# Patient Record
Sex: FEMALE | Race: WHITE | Employment: FULL TIME | URBAN - METROPOLITAN AREA
[De-identification: names, ages, dates, MRNs, and addresses within clinical notes are randomized per-mention and may not be internally consistent; named-entity substitution may affect disease eponyms.]

---

## 2018-01-14 ENCOUNTER — HOSPITAL ENCOUNTER (EMERGENCY)
Facility: CLINIC | Age: 28
Discharge: HOME | End: 2018-01-14
Attending: PHYSICIAN ASSISTANT | Admitting: PHYSICIAN ASSISTANT
Payer: MEDICAID

## 2018-01-14 VITALS
OXYGEN SATURATION: 97 % | SYSTOLIC BLOOD PRESSURE: 124 MMHG | DIASTOLIC BLOOD PRESSURE: 86 MMHG | WEIGHT: 174 LBS | RESPIRATION RATE: 16 BRPM | TEMPERATURE: 99.3 F | HEART RATE: 106 BPM

## 2018-01-14 DIAGNOSIS — J11.1 INFLUENZA-LIKE ILLNESS: ICD-10-CM

## 2018-01-14 LAB
INTERNAL QC OK POCT: YES
S PYO AG THROAT QL IA.RAPID: NEGATIVE

## 2018-01-14 PROCEDURE — 87880 STREP A ASSAY W/OPTIC: CPT | Performed by: PHYSICIAN ASSISTANT

## 2018-01-14 PROCEDURE — G0463 HOSPITAL OUTPT CLINIC VISIT: HCPCS

## 2018-01-14 PROCEDURE — 87081 CULTURE SCREEN ONLY: CPT | Performed by: PHYSICIAN ASSISTANT

## 2018-01-14 PROCEDURE — 99213 OFFICE O/P EST LOW 20 MIN: CPT | Performed by: PHYSICIAN ASSISTANT

## 2018-01-14 RX ORDER — BENZONATATE 100 MG/1
100 CAPSULE ORAL 3 TIMES DAILY PRN
Qty: 42 CAPSULE | Refills: 0 | Status: SHIPPED | OUTPATIENT
Start: 2018-01-14

## 2018-01-14 RX ORDER — ALBUTEROL SULFATE 90 UG/1
2 AEROSOL, METERED RESPIRATORY (INHALATION) EVERY 6 HOURS PRN
Qty: 1 INHALER | Refills: 0 | Status: SHIPPED | OUTPATIENT
Start: 2018-01-14

## 2018-01-14 NOTE — ED AVS SNAPSHOT
[facility] Emergency Department    [address] [address]    Phone:  [phone]    Fax:  [phone]                                       Cristina Patel   MRN: [id number]    Department:  [facility] Emergency Department   Date of Visit:  1/14/2018           After Visit Summary Signature Page     I have received my discharge instructions, and my questions have been answered. I have discussed any challenges I see with this plan with the nurse or doctor.    ..........................................................................................................................................  Patient/Patient Representative Signature      ..........................................................................................................................................  Patient Representative Print Name and Relationship to Patient    ..................................................               ................................................  Date                                            Time    ..........................................................................................................................................  Reviewed by Signature/Title    ...................................................              ..............................................  Date                                                            Time

## 2018-01-14 NOTE — ED AVS SNAPSHOT
[facility] Emergency Department    [address] [address]    Phone:  [phone]    Fax:  [phone]                                       Cristina Patel   MRN: [id number]    Department:  [facility] Emergency Department   Date of Visit:  1/14/2018           Patient Information     Date Of Birth          1990        Your diagnoses for this visit were:     Influenza-like illness        You were seen by Klarissa Richards PA-C.      Follow-up Information     Follow up In 3 days.    Why:  if no resolution of fever or sooner if new or worsening symptoms       Discharge References/Attachments     (ADULT), INFLUENZA (ENGLISH)      24 Hour Appointment Hotline       To make an appointment at any [facility] clinic, call [phone] ([phone]). If you don't have a family doctor or clinic, we will help you find one. [facility] clinics are conveniently located to serve the needs of you and your family.             Review of your medicines      START taking        Dose / Directions Last dose taken    albuterol 108 (90 BASE) MCG/ACT Inhaler   Commonly known as:  PROAIR HFA/PROVENTIL HFA/VENTOLIN HFA   Dose:  2 puff   Quantity:  1 Inhaler        Inhale 2 puffs into the lungs every 6 hours as needed for shortness of breath / dyspnea or wheezing   Refills:  0        benzonatate 100 MG capsule   Commonly known as:  TESSALON   Dose:  100 mg   Quantity:  42 capsule        Take 1 capsule (100 mg) by mouth 3 times daily as needed   Refills:  0                Prescriptions were sent or printed at these locations (2 Prescriptions)                   [facility] [address]   [address] [address]    Telephone:  [phone]   Fax:  [phone]   Hours:                  E-Prescribed (2 of 2)         albuterol (PROAIR HFA/PROVENTIL HFA/VENTOLIN HFA) 108 (90 BASE) MCG/ACT Inhaler               benzonatate (TESSALON) 100 MG capsule               "  Procedures and tests performed during your visit     Beta strep group A culture    Rapid strep group A screen POCT      Orders Needing Specimen Collection     None      Pending Results     Date and Time Order Name Status Description    1/14/2018 1811 Beta strep group A culture In process             Pending Culture Results     Date and Time Order Name Status Description    1/14/2018 1811 Beta strep group A culture In process             Pending Results Instructions     If you had any lab results that were not finalized at the time of your Discharge, you can call the ED Lab Result RN at [phone]. You will be contacted by this team for any positive Lab results or changes in treatment. The nurses are available 7 days a week from 10A to 6:30P.  You can leave a message 24 hours per day and they will return your call.        Test Results From Your Hospital Stay        1/14/2018  6:11 PM      Component Results     Component Value Ref Range & Units Status    Rapid Strep A Screen Negative neg Final    Internal QC OK Yes  Final         1/14/2018  6:23 PM                Thank you for choosing [facility]       Thank you for choosing [facility] for your care. Our goal is always to provide you with excellent care. Hearing back from our patients is one way we can continue to improve our services. Please take a few minutes to complete the written survey that you may receive in the mail after you visit with us. Thank you!        [employer] Information     [employer] lets you send messages to your doctor, view your test results, renew your prescriptions, schedule appointments and more. To sign up, go to [email or web address] . Click on \"Log in\" on the left side of the screen, which will take you to the Welcome page. Then click on \"Sign up Now\" on the right side of the page.     You will be asked to enter the access code listed below, as well as some personal information. Please follow the directions to create your username and " password.     Your access code is: [email or web address]  Expires: 2018  6:26 PM     Your access code will  in 90 days. If you need help or a new code, please call your [place] clinic or [phone].        Care EveryWhere ID     This is your Care EveryWhere ID. This could be used by other organizations to access your [place] medical records  [id number]        Equal Access to Services     TSEPHANIE PICKARD : Winston bairdo Sogiuseppe, waaxda luqadaha, qaybta kaalmada adeegyada, octaviano figueroa . So [facility] [phone].    ATENCIÓN: Si habla español, tiene a ceron disposición servicios gratuitos de asistencia lingüística. Llame al [phone].    We comply with applicable federal civil rights laws and [place] laws. We do not discriminate on the basis of race, color, national origin, age, disability, sex, sexual orientation, or gender identity.            After Visit Summary       This is your record. Keep this with you and show to your community pharmacist(s) and doctor(s) at your next visit.

## 2018-01-14 NOTE — ED NOTES
Patient here for SCOTT, symptoms started 3 days ago.  Patient presents amulatory to the urgent care.

## 2018-01-15 NOTE — ED PROVIDER NOTES
History     Chief Complaint   Patient presents with     Influenza     HPI  Cristina Patel is a 27 year old female who presents to the urgent care with concern over illness which began 36 hours ago.  Patient complains of fever up to 101 orally, chills, myalgias, nasal congestion, cough.  She does complain of some shortness of breath directable coughing however denies any with activity or at rest.  She has not had any wheezing, vomiting, diarrhea or abdominal pain.  She has attempted to treat with ibuprofen, last dose was approximately 6 hours prior to arrival. She states she did have a history of asthma as a child however has not required any treatment as an adult.  She is a half pack a day smoker.    Problem List:    There are no active problems to display for this patient.       Past Medical History:    History reviewed. No pertinent past medical history.    Past Surgical History:    History reviewed. No pertinent surgical history.    Family History:    No family history on file.    Social History:  Marital Status:  Single [1]  Social History   Substance Use Topics     Smoking status: Never Smoker     Smokeless tobacco: Never Used     Alcohol use Not on file        Medications:      albuterol (PROAIR HFA/PROVENTIL HFA/VENTOLIN HFA) 108 (90 BASE) MCG/ACT Inhaler   benzonatate (TESSALON) 100 MG capsule     Review of Systems  CONSTITUTIONAL:POSITIVE  for fever up to 101, chills, myalgias   INTEGUMENTARY/SKIN: NEGATIVE for worrisome rashes, moles or lesions  EYES: NEGATIVE for vision changes or irritation  ENT/MOUTH: POSITIVE for sore throat, nasal congestion, NEGATIVE for ear pain   RESP:POSITIVE for cough and shortness of breath NEGATIVE for wheezing  GI: POSITIVE for nausea and NEGATIVE for abdominal pain, diarrhea and vomiting  Physical Exam   BP: 124/86  Pulse: 106  Temp: 99.3  F (37.4  C)  Resp: 16  Weight: 78.9 kg (174 lb)  SpO2: 97 %    Physical Exam  GENERAL APPEARANCE: healthy, alert and no  distress  EYES: EOMI,  PERRL, conjunctiva clear  HENT: ear canals and TM's normal.  His discharge present.  Posterior pharynx is nonerythematous without exudate  NECK: supple, nontender, no lymphadenopathy  RESP: lungs clear to auscultation - no rales, rhonchi or wheezes  CV: tachycardia, regular rhythm, normal S1 S2, no murmur noted  ABDOMEN:  soft, nontender, no HSM or masses and bowel sounds normal  SKIN: no suspicious lesions or rashes  ED Course     ED Course     Procedures          Critical Care time:  none            Labs Ordered and Resulted from Time of ED Arrival Up to the Time of Departure from the ED   RAPID STREP GROUP A SCREEN POCT - Normal   BETA STREP GROUP A CULTURE     Assessments & Plan (with Medical Decision Making)     I have reviewed the nursing notes.    I have reviewed the findings, diagnosis, plan and need for follow up with the patient.       Discharge Medication List as of 1/14/2018  6:26 PM      START taking these medications    Details   albuterol (PROAIR HFA/PROVENTIL HFA/VENTOLIN HFA) 108 (90 BASE) MCG/ACT Inhaler Inhale 2 puffs into the lungs every 6 hours as needed for shortness of breath / dyspnea or wheezing, Disp-1 Inhaler, R-0, E-Prescribe      benzonatate (TESSALON) 100 MG capsule Take 1 capsule (100 mg) by mouth 3 times daily as needed, Disp-42 capsule, R-0, E-Prescribe           Final diagnoses:   Influenza-like illness     19-year-old female presents to urgent care with concern over 36 hour history of nasal congestion, cough, fever, chills, myalgias.  She was noted to be minimally tachycardic upon arrival.  Remainder vital signs within normal limits.  Physical exam findings as described above.  As part of evaluation she did have rapid strep test which was negative with culture pending prior to my examination.  Symptoms are classic for influenza.  I have low suspicion for pneumonia, pertussis, PE, bronchitis, bacterial sinusitis at this time.  I did discuss her/benefits of  Tamiflu with patient however she agreed to defer at this time.  She was given prescriptions for albuterol inhaler, Tessalon for symptomatic relief.  She was instructed to follow-up with primary care provider if no resolution of fever within the next 48-72 hours.  Worrisome reasons to return to the ER/UC sooner discussed    1/14/2018   [facility] EMERGENCY DEPARTMENT     Klarissa Richards PA-C  01/14/18 [id number]

## 2018-01-16 LAB
BACTERIA SPEC CULT: NORMAL
Lab: NORMAL
SPECIMEN SOURCE: NORMAL

## 2018-01-24 ENCOUNTER — ALLIED HEALTH/NURSE VISIT (OUTPATIENT)
Dept: NURSING | Facility: CLINIC | Age: 28
End: 2018-01-24
Payer: MEDICAID

## 2018-01-24 DIAGNOSIS — Z11.1 SCREENING EXAMINATION FOR PULMONARY TUBERCULOSIS: Primary | ICD-10-CM

## 2018-01-24 PROCEDURE — 99207 ZZC NO CHARGE NURSE ONLY: CPT

## 2018-01-24 PROCEDURE — 86580 TB INTRADERMAL TEST: CPT

## 2018-01-24 NOTE — MR AVS SNAPSHOT
"              After Visit Summary   1/24/2018    Cristina Patel    MRN: [id number]           Patient Information     Date Of Birth          1990        Visit Information        Provider Department      1/24/2018 11:00 AM AN ANCILLARY [facility]        Today's Diagnoses     Screening examination for pulmonary tuberculosis    -  1       Follow-ups after your visit        Your next 10 appointments already scheduled     Jan 26, 2018 12:00 PM CST   Nurse Only with An Rn   [facility]    [address] [facility] [address] [phone]              Who to contact     If you have questions or need follow up information about today's clinic visit or your schedule please contact [facility] directly at [phone].  Normal or non-critical lab and imaging results will be communicated to you by MyChart, letter or phone within 4 business days after the clinic has received the results. If you do not hear from us within 7 days, please contact the clinic through [employer] or phone. If you have a critical or abnormal lab result, we will notify you by phone as soon as possible.  Submit refill requests through [employer] or call your pharmacy and they will forward the refill request to us. Please allow 3 business days for your refill to be completed.          Additional Information About Your Visit        [employer] Information     [employer] lets you send messages to your doctor, view your test results, renew your prescriptions, schedule appointments and more. To sign up, go to [email or web address] . Click on \"Log in\" on the left side of the screen, which will take you to the Welcome page. Then click on \"Sign up Now\" on the right side of the page.     You will be asked to enter the access code listed below, as well as some personal information. Please follow the directions to create your username and password.     Your access code is: " [email or web address]  Expires: 2018  6:26 PM     Your access code will  in 90 days. If you need help or a new code, please call your [place] clinic or [phone].        Care EveryWhere ID     This is your Care EveryWhere ID. This could be used by other organizations to access your [place] medical records  [id number]         Blood Pressure from Last 3 Encounters:   18 124/86    Weight from Last 3 Encounters:   18 174 lb (78.9 kg)              We Performed the Following     TB INTRADERMAL TEST        Primary Care Provider Office Phone # Fax #    Noe [facility] [phone]       [address] [address]        Equal Access to Services     STEPHANIE PICKARD : Hadii aad ku hadasho Soomaali, waaxda luqadaha, qaybta kaalmada adeegyada, waxay idiin haymissyn jona figueroa . So [facility] [phone].    ATENCIÓN: Si habla español, tiene a ceron disposición servicios gratuitos de asistencia lingüística. Ll[facility] [phone].    We comply with applicable federal civil rights laws and [place] laws. We do not discriminate on the basis of race, color, national origin, age, disability, sex, sexual orientation, or gender identity.            Thank you!     [facility] you [facility]  [facility]. Our goal is always to provide you with excellent care. Hearing back from our patients is one way we can continue to improve our services. Please take a few minutes to complete the written survey that you may receive in the mail after your visit with us. Thank you!             Your Updated Medication List - Protect others around you: Learn how to safely use, store and throw away your medicines at www.disposemymeds.org.          This list is accurate as of 18 11:10 AM.  Always use your most recent med list.                   Brand Name Dispense Instructions for use Diagnosis    albuterol 108 (90 BASE) MCG/ACT Inhaler    PROAIR HFA/PROVENTIL HFA/VENTOLIN HFA     1 Inhaler    Inhale 2 puffs into the lungs every 6 hours as needed for shortness of breath / dyspnea or wheezing        benzonatate 100 MG capsule    TESSALON    42 capsule    Take 1 capsule (100 mg) by mouth 3 times daily as needed

## 2018-01-24 NOTE — PROGRESS NOTES
The patient is asked the following questions today and these are her answers:    -Have you had a mantoux administered in the past 30 days?    No  -Have you had a previous positive Mantoux.  No  -Have you received BCG in the past.  No  -Have you had a live vaccine  (MMR, Varicella, OPV, Yellow Fever) in the last 6 weeks.  No  -Have you had and active  viral or bacterial infection in the past 6 weeks.  No  -Have you received corticosteroids or immunosuppressive agents in the past 6 weeks.  No  -Have you been diagnosed with HIV?  No  -Do you have a maglinancy?  No     Antonia Gan MA

## 2018-05-29 ENCOUNTER — OFFICE VISIT (OUTPATIENT)
Dept: URGENT CARE | Facility: URGENT CARE | Age: 28
End: 2018-05-29
Payer: COMMERCIAL

## 2018-05-29 VITALS
SYSTOLIC BLOOD PRESSURE: 120 MMHG | DIASTOLIC BLOOD PRESSURE: 71 MMHG | HEART RATE: 115 BPM | WEIGHT: 184 LBS | OXYGEN SATURATION: 99 % | TEMPERATURE: 98.4 F

## 2018-05-29 DIAGNOSIS — R07.0 THROAT PAIN: Primary | ICD-10-CM

## 2018-05-29 LAB
DEPRECATED S PYO AG THROAT QL EIA: NORMAL
SPECIMEN SOURCE: NORMAL

## 2018-05-29 PROCEDURE — 99203 OFFICE O/P NEW LOW 30 MIN: CPT | Performed by: PHYSICIAN ASSISTANT

## 2018-05-29 PROCEDURE — 87081 CULTURE SCREEN ONLY: CPT | Performed by: PHYSICIAN ASSISTANT

## 2018-05-29 PROCEDURE — 87880 STREP A ASSAY W/OPTIC: CPT | Performed by: PHYSICIAN ASSISTANT

## 2018-05-29 NOTE — MR AVS SNAPSHOT
After Visit Summary   5/29/2018    Cristina Patel    MRN: [id number]           Patient Information     Date Of Birth          1990        Visit Information        Provider Department      5/29/2018 7:50 PM Ritu Pelayo PA-C [facility]        Today's Diagnoses     Throat pain    -  1       Follow-ups after your visit        Who to contact     If you have questions or need follow up information about today's clinic visit or your schedule please contact [facility] directly at [phone].  Normal or non-critical lab and imaging results will be communicated to you by MyChart, letter or phone within 4 business days after the clinic has received the results. If you do not hear from us within 7 days, please contact the clinic through MyChart or phone. If you have a critical or abnormal lab result, we will notify you by phone as soon as possible.  Submit refill requests through [employer]Z or call your pharmacy and they will forward the refill request to us. Please allow 3 business days for your refill to be completed.          Additional Information About Your Visit        Care EveryWhere ID     This is your Care EveryWhere ID. This could be used by other organizations to access your [place] medical records  [id number]        Your Vitals Were     Pulse Temperature Pulse Oximetry             115 98.4  F (36.9  C) (Oral) 99%          Blood Pressure from Last 3 Encounters:   05/29/18 120/71   01/14/18 124/86    Weight from Last 3 Encounters:   05/29/18 184 lb (83.5 kg)   01/14/18 174 lb (78.9 kg)              We Performed the Following     Beta strep group A culture     Strep, Rapid Screen        Primary Care Provider Office Phone # Fax #    [facility] [phone] [address] [address]        Equal Access to Services     STEPHANIE PICKARD AH: janene Rgoers qaybta kaalmada adeegyada, waxay  avis armstrongheather garcia'aan ah. So [facility] [phone].    ATENCIÓN: Si habla madi, tiene a ceron disposición servicios gratuitos de asistencia lingüística. Myranda al [phone].    We comply with applicable federal civil rights laws and [place] laws. We do not discriminate on the basis of race, color, national origin, age, disability, sex, sexual orientation, or gender identity.            Thank you!     Thank you for choosing [facility]  for your care. Our goal is always to provide you with excellent care. Hearing back from our patients is one way we can continue to improve our services. Please take a few minutes to complete the written survey that you may receive in the mail after your visit with us. Thank you!             Your Updated Medication List - Protect others around you: Learn how to safely use, store and throw away your medicines at www.disposemymeds.org.          This list is accurate as of 5/29/18  8:29 PM.  Always use your most recent med list.                   Brand Name Dispense Instructions for use Diagnosis    albuterol 108 (90 Base) MCG/ACT Inhaler    PROAIR HFA/PROVENTIL HFA/VENTOLIN HFA    1 Inhaler    Inhale 2 puffs into the lungs every 6 hours as needed for shortness of breath / dyspnea or wheezing        benzonatate 100 MG capsule    TESSALON    42 capsule    Take 1 capsule (100 mg) by mouth 3 times daily as needed

## 2018-05-30 LAB
BACTERIA SPEC CULT: NORMAL
SPECIMEN SOURCE: NORMAL

## 2018-05-30 NOTE — PROGRESS NOTES
SUBJECTIVE:                                                    Cristina Patel is a 28 year old female who presents to clinic today for the following health issues:    Sore throat ×1 day.  No cough.  No nasal congestion.  No fever.  No vomiting or nausea.  No constipation or diarrhea.  Was exposed to strep by a little boy she was with the entire day. Positive for HA.    PMX- no allergic rhinitis    Allergies   Allergen Reactions     Nsaids      Vancomycin Itching       No past medical history on file.      Current Outpatient Prescriptions on File Prior to Visit:  albuterol (PROAIR HFA/PROVENTIL HFA/VENTOLIN HFA) 108 (90 BASE) MCG/ACT Inhaler Inhale 2 puffs into the lungs every 6 hours as needed for shortness of breath / dyspnea or wheezing (Patient not taking: Reported on 5/29/2018)   benzonatate (TESSALON) 100 MG capsule Take 1 capsule (100 mg) by mouth 3 times daily as needed     No current facility-administered medications on file prior to visit.     Social History   Substance Use Topics     Smoking status: Never Smoker     Smokeless tobacco: Never Used     Alcohol use Not on file       ROS:  CONSTITUTIONAL: Negative for fatigue or fever.  EYES: Negative for eye problems.  ENT: As above.  RESP: As above.  CV: Negative for chest pains.  GI: Negative for vomiting.  MUSCULOSKELETAL:  Negative for significant muscle or joint pains.  NEUROLOGIC: Positive for headaches.  SKIN: Negative for rash.    OBJECTIVE:  /71  Pulse 115  Temp 98.4  F (36.9  C) (Oral)  Wt 184 lb (83.5 kg)  SpO2 99%  GENERAL APPEARANCE: Healthy, alert and no distress.  EYES:Conjunctiva/sclera clear.  EARS: No cerumen.   Ear canals w/o erythema.  TM's intact w/o erythema.    NOSE/MOUTH: Nose without ulcers, erythema or lesions.  SINUSES: No maxillary sinus tenderness.  THROAT: Mild  No erythema w/o tonsillar enlargement . No exudates.  NECK: Supple, nontender, no lymphadenopathy.  RESP: Lungs clear to auscultation - no rales, rhonchi  or wheezes  CV: Regular rate and rhythm, normal S1 S2, no murmur noted.  NEURO: Awake, alert    SKIN: No rashes    Results for orders placed or performed in visit on 05/29/18   Strep, Rapid Screen   Result Value Ref Range    Specimen Description Throat     Rapid Strep A Screen       NEGATIVE: No Group A streptococcal antigen detected by immunoassay, await culture report.      ASSESSMENT:     ICD-10-CM    1. Throat pain R07.0 Strep, Rapid Screen         PLAN:Likely viral. Salt water gargles. Ibu.Lots of rest and fluids.  RTC if any worsening symptoms or if not improving.    Ritu Pelayo PA-C

## 2020-07-16 ENCOUNTER — HOSPITAL ENCOUNTER (EMERGENCY)
Facility: CLINIC | Age: 30
Discharge: HOME | End: 2020-07-16
Attending: PHYSICIAN ASSISTANT | Admitting: PHYSICIAN ASSISTANT

## 2020-07-16 ENCOUNTER — APPOINTMENT (OUTPATIENT)
Dept: GENERAL RADIOLOGY | Facility: CLINIC | Age: 30
End: 2020-07-16
Attending: PHYSICIAN ASSISTANT

## 2020-07-16 VITALS
RESPIRATION RATE: 20 BRPM | SYSTOLIC BLOOD PRESSURE: 117 MMHG | DIASTOLIC BLOOD PRESSURE: 93 MMHG | OXYGEN SATURATION: 98 % | TEMPERATURE: 98.5 F | WEIGHT: 199.8 LBS | HEART RATE: 81 BPM

## 2020-07-16 DIAGNOSIS — S93.601A RIGHT FOOT SPRAIN: ICD-10-CM

## 2020-07-16 PROCEDURE — 73630 X-RAY EXAM OF FOOT: CPT | Mod: TC,RT

## 2020-07-16 PROCEDURE — 25000128 H RX IP 250 OP 636: Performed by: PHYSICIAN ASSISTANT

## 2020-07-16 PROCEDURE — 99284 EMERGENCY DEPT VISIT MOD MDM: CPT | Performed by: PHYSICIAN ASSISTANT

## 2020-07-16 PROCEDURE — 25000132 ZZH RX MED GY IP 250 OP 250 PS 637: Performed by: PHYSICIAN ASSISTANT

## 2020-07-16 PROCEDURE — 99284 EMERGENCY DEPT VISIT MOD MDM: CPT | Mod: Z6 | Performed by: PHYSICIAN ASSISTANT

## 2020-07-16 RX ORDER — OXYCODONE HYDROCHLORIDE 5 MG/1
2.5-5 TABLET ORAL EVERY 6 HOURS PRN
Qty: 6 TABLET | Refills: 0 | Status: SHIPPED | OUTPATIENT
Start: 2020-07-16

## 2020-07-16 RX ORDER — OXYCODONE HYDROCHLORIDE 5 MG/1
5 TABLET ORAL ONCE
Status: COMPLETED | OUTPATIENT
Start: 2020-07-16 | End: 2020-07-16

## 2020-07-16 RX ORDER — ONDANSETRON 4 MG/1
4 TABLET, ORALLY DISINTEGRATING ORAL ONCE
Status: COMPLETED | OUTPATIENT
Start: 2020-07-16 | End: 2020-07-16

## 2020-07-16 RX ADMIN — OXYCODONE HYDROCHLORIDE 5 MG: 5 TABLET ORAL at 13:39

## 2020-07-16 RX ADMIN — ONDANSETRON 4 MG: 4 TABLET, ORALLY DISINTEGRATING ORAL at 13:39

## 2020-07-16 NOTE — ED TRIAGE NOTES
"Patient c/o right foot pain and shin pain. She heard a \"pop\" yesterday while lifting something heavy.  "

## 2020-07-16 NOTE — ED AVS SNAPSHOT
[facility] Emergency Department  [address] [place] [address]  Phone:  [phone]  Fax:  [phone]                                    Cristina Patel   MRN: [id number]    Department:  [facility] Emergency Department   Date of Visit:  7/16/2020           After Visit Summary Signature Page    I have received my discharge instructions, and my questions have been answered. I have discussed any challenges I see with this plan with the nurse or doctor.    ..........................................................................................................................................  Patient/Patient Representative Signature      ..........................................................................................................................................  Patient Representative Print Name and Relationship to Patient    ..................................................               ................................................  Date                                   Time    ..........................................................................................................................................  Reviewed by Signature/Title    ...................................................              ..............................................  Date                                               Time          22EPIC Rev 08/18

## 2020-07-16 NOTE — ED PROVIDER NOTES
History     Chief Complaint   Patient presents with     Foot Pain     HPI  Cristina Patel is a 30 year old female who presents for evaluation of an injury to her right foot that occurred 2 hours prior to arrival.  She was lifting a heavy bar when she states the ground let loose below her.  She felt a popping sensation with acute pain in the second through fourth metatarsal area.  She has not had any swelling or bruising.  Has been unable to bear weight due to the pain.  Describes it as a sharp stabbing pain.  She took Tylenol 2 hours prior to arrival 1000 mg, but her pain is still 7 on a scale of 10.  She denies any prior foot problems.  Denies any numbness or tingling of the toes or foot.        Allergies:  Allergies   Allergen Reactions     Codeine Nausea     Gabapentin Nausea and Other (See Comments)     nightmares       Nsaids      Pregabalin Other (See Comments)     Other reaction(s): Sedation  Can't stay awake  Can't stay awake       Sulfacetamide Itching     Eye drops, red/itchy/swelling  Eye drops, red/itchy/swelling       Tolmetin Other (See Comments)     bleeding  She has platelet sensitivity syndrome.      Vancomycin Itching     Adhesive Tape Rash     Micropore tape       Problem List:    There are no active problems to display for this patient.       Past Medical History:    History reviewed. No pertinent past medical history.    Past Surgical History:    History reviewed. No pertinent surgical history.    Family History:    No family history on file.    Social History:  Marital Status:  Single [1]  Social History     Tobacco Use     Smoking status: Current Every Day Smoker     Packs/day: 0.50     Smokeless tobacco: Never Used   Substance Use Topics     Alcohol use: Yes     Comment: rarely     Drug use: Never        Medications:    oxyCODONE (ROXICODONE) 5 MG tablet  albuterol (PROAIR HFA/PROVENTIL HFA/VENTOLIN HFA) 108 (90 BASE) MCG/ACT Inhaler  benzonatate (TESSALON) 100 MG  capsule          Review of Systems   All other systems reviewed and are negative.      Physical Exam   BP: (!) 117/93  Pulse: 81  Temp: 98.5  F (36.9  C)  Resp: 20  Weight: 90.6 kg (199 lb 12.8 oz)  SpO2: 98 %      Physical Exam  Vitals signs and nursing note reviewed.   Constitutional:       General: She is not in acute distress.     Appearance: She is not diaphoretic.   HENT:      Head: Normocephalic and atraumatic.      Right Ear: External ear normal.      Left Ear: External ear normal.      Nose: Nose normal.      Mouth/Throat:      Pharynx: No oropharyngeal exudate.   Eyes:      General: No scleral icterus.        Right eye: No discharge.         Left eye: No discharge.      Conjunctiva/sclera: Conjunctivae normal.      Pupils: Pupils are equal, round, and reactive to light.   Neck:      Musculoskeletal: Normal range of motion and neck supple.      Thyroid: No thyromegaly.   Cardiovascular:      Rate and Rhythm: Normal rate and regular rhythm.      Heart sounds: Normal heart sounds. No murmur.   Pulmonary:      Effort: Pulmonary effort is normal. No respiratory distress.      Breath sounds: Normal breath sounds. No wheezing or rales.   Chest:      Chest wall: No tenderness.   Abdominal:      General: Bowel sounds are normal. There is no distension.      Palpations: Abdomen is soft. There is no mass.      Tenderness: There is no abdominal tenderness. There is no guarding or rebound.   Musculoskeletal:         General: No deformity.      Right ankle: Normal. She exhibits normal range of motion, no swelling, no ecchymosis, no deformity, no laceration and normal pulse. No tenderness. Achilles tendon exhibits no pain, no defect and normal Kirkland's test results.      Right lower leg: Normal. She exhibits no tenderness, no bony tenderness, no swelling, no deformity and no laceration. No edema.      Right foot: Decreased range of motion. Tenderness present. No swelling, deformity or laceration.         Feet:    Lymphadenopathy:      Cervical: No cervical adenopathy.   Skin:     General: Skin is warm and dry.      Capillary Refill: Capillary refill takes less than 2 seconds.      Findings: No erythema or rash.   Neurological:      Mental Status: She is alert and oriented to person, place, and time.      Cranial Nerves: No cranial nerve deficit.   Psychiatric:         Behavior: Behavior normal.         Thought Content: Thought content normal.         ED Course        Procedures               Critical Care time:  none               Results for orders placed or performed during the hospital encounter of 07/16/20 (from the past 24 hour(s))   XR Foot Right 3 Views    Narrative    RIGHT FOOT THREE OR MORE VIEWS   7/16/2020 1:53 PM     HISTORY: Right 2nd-4th metatarsal discomfort, injury.    COMPARISON: None.      Impression    IMPRESSION: No evidence of fracture. Joint spaces preserved.    KRISHAN SPRAGUE MD       Medications   oxyCODONE (ROXICODONE) tablet 5 mg (5 mg Oral Given 7/16/20 1130)   ondansetron (ZOFRAN-ODT) ODT tab 4 mg (4 mg Oral Given 7/16/20 7289)       Assessments & Plan (with Medical Decision Making)  Right foot sprain     30 year old female presents for evaluation of acute onset right foot pain 2 hours ago when she was lifting a bar and the ground gave way beneath her.  She felt a sharp pain with an audible pop in her dorsal foot.  Has had a hard time bearing weight ever since then.  Pain rated 7 on a scale of 10 and not responding to Tylenol 1000 mg.  On exam she is afebrile.  No deformity, bruising, or swelling of the foot noted.  Tender to palpation of the second-fourth metatarsals without crepitus or other abnormality.  She is requesting pain medication, therefore I did give her 5 mg of oxycodone along with Zofran as she states that she gets nauseous with any pain medication.  X-rays displayed no evidence for fracture.  She was wrapped in an Ace bandage and given a cam walker.  She is able to bear  weight, therefore she was not given crutches.  Patient requested pain medication for at home.  [place]  was referenced and no concerning refill patterns identified.  She was given 6 tabs of oxycodone to use for breakthrough pain if ibuprofen and Tylenol were not helping her symptoms.  Rest, ice, and elevation discussed.  Follow-up with PCP or podiatry in 1 week if symptoms persist or not improving.  Her pain was much improved with administration of oxycodone and Zofran here in the ED.     I have reviewed the nursing notes.    I have reviewed the findings, diagnosis, plan and need for follow up with the patient.       Discharge Medication List as of 7/16/2020  2:28 PM      START taking these medications    Details   oxyCODONE (ROXICODONE) 5 MG tablet Take 0.5-1 tablets (2.5-5 mg) by mouth every 6 hours as needed for severe pain, Disp-6 tablet,R-0, Local Print             Final diagnoses:   Right foot sprain       Disclaimer: This note consists of symbols derived from keyboarding, dictation and/or voice recognition software. As a result, there may be errors in the script that have gone undetected. Please consider this when interpreting information found in this chart.      7/16/2020   Vimal Orourke PA-C   [facility] EMERGENCY DEPARTMENT     Vimal Orourke PA-C  07/17/20 0032

## 2020-07-16 NOTE — DISCHARGE INSTRUCTIONS
It was a pleasure working with you today!  I hope your condition improves rapidly!     Thankfully, your x-ray was negative.  No sign of fracture.  You did sprain some ligaments likely in your foot with the recent injury.    Please REST your foot!!  Elevate it as much as possible.  Use ice on your foot for 20 minutes every couple hours.  It is okay to take Tylenol up to 1000 mg every 6 hours as needed for pain.  Use your cam walker at all times when you are active.  If you are sitting with your foot elevated, you do not need the cam walker on.  Bear weight with a cam walker only if this does not cause pain.  Otherwise consider using crutches.